# Patient Record
Sex: FEMALE
[De-identification: names, ages, dates, MRNs, and addresses within clinical notes are randomized per-mention and may not be internally consistent; named-entity substitution may affect disease eponyms.]

---

## 2022-11-22 PROBLEM — Z00.00 ENCOUNTER FOR PREVENTIVE HEALTH EXAMINATION: Status: ACTIVE | Noted: 2022-11-22

## 2022-12-02 ENCOUNTER — APPOINTMENT (OUTPATIENT)
Dept: VASCULAR SURGERY | Facility: CLINIC | Age: 52
End: 2022-12-02

## 2022-12-30 ENCOUNTER — APPOINTMENT (OUTPATIENT)
Dept: VASCULAR SURGERY | Facility: CLINIC | Age: 52
End: 2022-12-30
Payer: COMMERCIAL

## 2022-12-30 VITALS
HEIGHT: 63 IN | SYSTOLIC BLOOD PRESSURE: 136 MMHG | HEART RATE: 72 BPM | BODY MASS INDEX: 30.12 KG/M2 | WEIGHT: 170 LBS | DIASTOLIC BLOOD PRESSURE: 85 MMHG

## 2022-12-30 PROCEDURE — 99204 OFFICE O/P NEW MOD 45 MIN: CPT

## 2022-12-30 NOTE — ASSESSMENT
[FreeTextEntry1] : Patient with right calf pain.\par \par Based on clinical examination and history I do not believe the patient has any significant arterial insufficiency.\par \par No evidence of significant varicosities or swelling and there is no significant venous insufficiency.\par \par Recommend further medical and neurological evaluation and management.

## 2022-12-30 NOTE — HISTORY OF PRESENT ILLNESS
[FreeTextEntry1] : Patient is a 52-year-old female with past medical history significant for coronary artery disease status post coronary stent placement about 10 years ago, history of CVA with no focal neurological symptoms about 10 years ago presenting to us with complaints of right lower extremity pain.\par \par Patient denies any history of renal failure, rest pain, or claudication symptoms.\par \par No history of smoking.\par \par No history of tissue loss.\par \par Symptoms are mostly in the right calf area especially at night and improves with motion.\par \par Patient can walk long distances without any difficulties.

## 2022-12-30 NOTE — CONSULT LETTER
[Dear  ___] : Dear  [unfilled], [Consult Letter:] : I had the pleasure of evaluating your patient, [unfilled]. [Please see my note below.] : Please see my note below. [Consult Closing:] : Thank you very much for allowing me to participate in the care of this patient.  If you have any questions, please do not hesitate to contact me. [Sincerely,] : Sincerely, [FreeTextEntry3] : Mitchell Aponte M.D., F.STEPHENS., R.P.TEJ.I.\par  of Vascular Surgery\par Assistant Professor of Radiology\par Director of Endovascular Program/ [facility]\par [facility]

## 2023-02-17 ENCOUNTER — APPOINTMENT (OUTPATIENT)
Dept: VASCULAR SURGERY | Facility: CLINIC | Age: 53
End: 2023-02-17
Payer: COMMERCIAL

## 2023-02-17 VITALS
OXYGEN SATURATION: 96 % | DIASTOLIC BLOOD PRESSURE: 72 MMHG | BODY MASS INDEX: 31.01 KG/M2 | SYSTOLIC BLOOD PRESSURE: 121 MMHG | HEIGHT: 63 IN | HEART RATE: 74 BPM | WEIGHT: 175 LBS | TEMPERATURE: 96 F

## 2023-02-17 PROCEDURE — 99214 OFFICE O/P EST MOD 30 MIN: CPT

## 2023-02-17 PROCEDURE — 93880 EXTRACRANIAL BILAT STUDY: CPT

## 2023-02-17 NOTE — ASSESSMENT
[FreeTextEntry1] : Patient with right calf pain.\par \par Based on clinical examination and history I do not believe the patient has any significant arterial insufficiency.\par \par No evidence of significant varicosities or swelling and there is no significant venous insufficiency.\par \par Recommend further medical and neurological evaluation and management.\par \par Patient with mild to moderate carotid disease which remains asymptomatic.\par \par Recommend aspirin and statin.